# Patient Record
Sex: MALE | Race: WHITE | NOT HISPANIC OR LATINO | URBAN - METROPOLITAN AREA
[De-identification: names, ages, dates, MRNs, and addresses within clinical notes are randomized per-mention and may not be internally consistent; named-entity substitution may affect disease eponyms.]

---

## 2023-08-30 ENCOUNTER — HOSPITAL ENCOUNTER (OUTPATIENT)
Dept: DATA CONVERSION | Facility: HOSPITAL | Age: 58
Discharge: HOME | End: 2023-08-30
Payer: COMMERCIAL

## 2023-08-30 DIAGNOSIS — R53.83 OTHER FATIGUE: ICD-10-CM

## 2023-08-30 DIAGNOSIS — I10 ESSENTIAL (PRIMARY) HYPERTENSION: ICD-10-CM

## 2023-08-30 DIAGNOSIS — Z12.5 ENCOUNTER FOR SCREENING FOR MALIGNANT NEOPLASM OF PROSTATE: ICD-10-CM

## 2023-08-30 DIAGNOSIS — R73.9 HYPERGLYCEMIA, UNSPECIFIED: ICD-10-CM

## 2023-08-30 DIAGNOSIS — Z00.00 ENCOUNTER FOR GENERAL ADULT MEDICAL EXAMINATION WITHOUT ABNORMAL FINDINGS: ICD-10-CM

## 2023-08-30 LAB
ALBUMIN SERPL-MCNC: 4.4 GM/DL (ref 3.5–5)
ALBUMIN/GLOB SERPL: 1.6 RATIO (ref 1.5–3)
ALP BLD-CCNC: 60 U/L (ref 35–125)
ALT SERPL-CCNC: 15 U/L (ref 5–40)
ANION GAP SERPL CALCULATED.3IONS-SCNC: 9 MMOL/L (ref 0–19)
APPEARANCE PLAS: NORMAL
AST SERPL-CCNC: 20 U/L (ref 5–40)
BASOPHILS # BLD AUTO: 0.04 K/UL (ref 0–0.22)
BASOPHILS NFR BLD AUTO: 0.5 % (ref 0–1)
BILIRUB SERPL-MCNC: 1 MG/DL (ref 0.1–1.2)
BUN SERPL-MCNC: 19 MG/DL (ref 8–25)
BUN/CREAT SERPL: 21.1 RATIO (ref 8–21)
CALCIUM SERPL-MCNC: 9.6 MG/DL (ref 8.5–10.4)
CHLORIDE SERPL-SCNC: 104 MMOL/L (ref 97–107)
CHOLEST SERPL-MCNC: 173 MG/DL (ref 133–200)
CHOLEST/HDLC SERPL: 3.9 RATIO
CO2 SERPL-SCNC: 26 MMOL/L (ref 24–31)
COLOR SPUN FLD: NORMAL
CREAT SERPL-MCNC: 0.9 MG/DL (ref 0.4–1.6)
DEPRECATED RDW RBC AUTO: 42.5 FL (ref 37–54)
DIFFERENTIAL METHOD BLD: ABNORMAL
EOSINOPHIL # BLD AUTO: 0.37 K/UL (ref 0–0.45)
EOSINOPHIL NFR BLD: 5 % (ref 0–3)
ERYTHROCYTE [DISTWIDTH] IN BLOOD BY AUTOMATED COUNT: 12.5 % (ref 11.7–15)
FASTING STATUS PATIENT QL REPORTED: NORMAL
GFR SERPL CREATININE-BSD FRML MDRD: 99 ML/MIN/1.73 M2
GLOBULIN SER-MCNC: 2.7 G/DL (ref 1.9–3.7)
GLUCOSE SERPL-MCNC: 96 MG/DL (ref 65–99)
HBA1C MFR BLD: 5.6 % (ref 4–6)
HCT VFR BLD AUTO: 48.2 % (ref 41–50)
HDLC SERPL-MCNC: 44 MG/DL
HGB BLD-MCNC: 16 GM/DL (ref 13.5–16.5)
IMM GRANULOCYTES # BLD AUTO: 0 K/UL (ref 0–0.1)
LDLC SERPL CALC-MCNC: 111 MG/DL (ref 65–130)
LYMPHOCYTES # BLD AUTO: 2.37 K/UL (ref 1.2–3.2)
LYMPHOCYTES NFR BLD MANUAL: 32.2 % (ref 20–40)
MCH RBC QN AUTO: 30.4 PG (ref 26–34)
MCHC RBC AUTO-ENTMCNC: 33.2 % (ref 31–37)
MCV RBC AUTO: 91.5 FL (ref 80–100)
MONOCYTES # BLD AUTO: 0.7 K/UL (ref 0–0.8)
MONOCYTES NFR BLD MANUAL: 9.5 % (ref 0–8)
NEUTROPHILS # BLD AUTO: 3.87 K/UL
NEUTROPHILS # BLD AUTO: 3.87 K/UL (ref 1.8–7.7)
NEUTROPHILS.IMMATURE NFR BLD: 0 % (ref 0–1)
NEUTS SEG NFR BLD: 52.8 % (ref 50–70)
NRBC BLD-RTO: 0 /100 WBC
PLATELET # BLD AUTO: 212 K/UL (ref 150–450)
PMV BLD AUTO: 11.2 CU (ref 7–12.6)
POTASSIUM SERPL-SCNC: 5 MMOL/L (ref 3.4–5.1)
PROT SERPL-MCNC: 7.1 G/DL (ref 5.9–7.9)
PSA SERPL-MCNC: 0.8 NG/ML (ref 0–4.1)
RBC # BLD AUTO: 5.27 M/UL (ref 4.5–5.5)
SODIUM SERPL-SCNC: 139 MMOL/L (ref 133–145)
TRIGL SERPL-MCNC: 92 MG/DL (ref 40–150)
TSH SERPL DL<=0.05 MIU/L-ACNC: 1.61 MIU/L (ref 0.27–4.2)
URATE SERPL-MCNC: 6.2 MG/DL (ref 3.6–7.7)
VIT B12 SERPL-MCNC: 474 PG/ML (ref 211–946)
WBC # BLD AUTO: 7.4 K/UL (ref 4.5–11)

## 2023-09-16 VITALS
BODY MASS INDEX: 27.46 KG/M2 | HEART RATE: 58 BPM | TEMPERATURE: 98 F | OXYGEN SATURATION: 98 % | HEIGHT: 73 IN | DIASTOLIC BLOOD PRESSURE: 70 MMHG | WEIGHT: 207.2 LBS | RESPIRATION RATE: 14 BRPM | SYSTOLIC BLOOD PRESSURE: 122 MMHG

## 2023-12-20 DIAGNOSIS — N52.9 ERECTILE DISORDER: Primary | ICD-10-CM

## 2023-12-29 RX ORDER — SILDENAFIL CITRATE 20 MG/1
TABLET ORAL
Qty: 30 TABLET | Refills: 5 | Status: SHIPPED | OUTPATIENT
Start: 2023-12-29 | End: 2024-05-08 | Stop reason: SDUPTHER

## 2024-04-25 ENCOUNTER — APPOINTMENT (OUTPATIENT)
Dept: PRIMARY CARE | Facility: CLINIC | Age: 59
End: 2024-04-25
Payer: COMMERCIAL

## 2024-05-07 PROBLEM — M35.3 POLYMYALGIA RHEUMATICA (MULTI): Status: ACTIVE | Noted: 2018-01-02

## 2024-05-07 PROBLEM — I10 HYPERTENSION: Status: ACTIVE | Noted: 2024-05-07

## 2024-05-07 PROBLEM — N52.9 ERECTILE DYSFUNCTION: Status: ACTIVE | Noted: 2024-05-07

## 2024-05-07 RX ORDER — LOSARTAN POTASSIUM 25 MG/1
25 TABLET ORAL DAILY
COMMUNITY

## 2024-05-07 RX ORDER — HYDROXYCHLOROQUINE SULFATE 200 MG/1
200 TABLET, FILM COATED ORAL EVERY OTHER DAY
COMMUNITY
Start: 2023-08-30

## 2024-05-07 RX ORDER — ESOMEPRAZOLE MAGNESIUM 40 MG/1
1 CAPSULE, DELAYED RELEASE ORAL
COMMUNITY
Start: 2021-08-24

## 2024-05-07 NOTE — PROGRESS NOTES
Subjective     HPI   Gael Hamilton is a 58 y.o. year old male patient with presenting to clinic with concern for   Chief Complaint   Patient presents with    New Patient Visit       Gael presents to clinic to establish care as a new patient. He was previously seen by Dr. Ashish Evans.    Rheumatology: Dr. Janeth Berg [facility]  Polymyalgia Rheumatica    Perianal lump noted for the past 2 weeks.      CARDIAC TESTIN24 ECHO  CONCLUSIONS:  - Exam indication: Palpitations  - The left ventricle is normal in size. Left ventricular systolic function is  normal. EF = 57 ± 5% (2D biplane) Normal left ventricular diastolic function.  - The right ventricle is normal in size. Right ventricular systolic function is normal.  - There are no significant valvular abnormalities.  - The patient has not had a prior [facility] echocardiographic exam for comparison.    Electronically signed by Nikita Obando MD on 2024 at 3:15:37 PM    Zio monitor- 11/10/23-23:  Patient had a min HR of 48 bpm, max HR of 178 bpm, and avg HR of 76  bpm. Predominant underlying rhythm was Sinus Rhythm. First Degree AV  Block was present. Junctional Rhythm was present. Junctional Rhythm was  detected within +/- 45 seconds of symptomatic patient event(s). Isolated  SVEs were rare (<1.0%), SVE Couplets were rare (<1.0%), and SVE  Triplets were rare (<1.0%). Isolated VEs were rare (<1.0%), VE Couplets  were rare (<1.0%), and no VE Triplets were present. Ventricular Bigeminy  and Trigeminy were present. No AF, SVT, VT or pauses seen.    CLINICAL VISITS:  24:  /83  Pulse (!) 58  Wt 95.3 kg (210 lb)  BMI 27.71 kg/m²   Ongoing intermittent palpitations and episodes where heart is not beating correctly.   May be related to stress and high work load although does not always correlate with work.   Still feels drained afterwards.   Sx lasting 30 seconds, sometimes longer. If sits and closes eye can relax and go back into rhythm.   No  lightheadedness , no syncope or pre syncope   Playing tennis recreationally. Good activity tolerance.   Zio reviewed.   Rare PAC, Rare PAC 10 sec episode of bigeminy., 17 sec episode of trigeminy  Junctional rhythm HR 80 bpm ( 3:42 AM)   ECHO normal Min abnormalities.     Cardiology [facility] Nikita Obando MD      Patient Active Problem List   Diagnosis    Erectile dysfunction    Polymyalgia rheumatica (Multi)    Primary osteoarthritis of left knee    Hypertension       Past Medical History:   Diagnosis Date    Old tear of anterior cruciate ligament 12/01/2016      Past Surgical History:   Procedure Laterality Date    KNEE      ACL repair      Family History   Problem Relation Name Age of Onset    Breast cancer Mother          92 yr old    Heart attack Father      Hyperlipidemia Father      Diabetes type I Daughter          2 daughters both have celiac disease    Celiac disease Daughter      Stomach cancer Maternal Grandmother        Social History     Tobacco Use    Smoking status: Former     Current packs/day: 0.50     Types: Cigarettes    Smokeless tobacco: Never    Tobacco comments:     When he was younger    Substance Use Topics    Alcohol use: Yes     Comment: social        Current Outpatient Medications:     esomeprazole (NexIUM) 40 mg DR capsule, Take 1 capsule (40 mg) by mouth once daily in the morning. Take before meals., Disp: , Rfl:     hydroxychloroquine (Plaquenil) 200 mg tablet, Take 1 tablet (200 mg) by mouth every other day., Disp: , Rfl:     losartan (Cozaar) 25 mg tablet, Take 1 tablet (25 mg) by mouth once daily., Disp: , Rfl:     sildenafil (Revatio) 20 mg tablet, TAKE UP TO 5 TABLETS BY MOUTH 1 HOUR PRIOR TO INTERCOURSE ONCE DAILY WHEN NEEDED., Disp: 30 tablet, Rfl: 5     Review of Systems  Constitutional: Denies fever  HEENT: Denies ST, earache  CVS: Denies Chest pain  Pulmonary: Denies wheezing, SOB  GI: Denies N/V  : Denies dysuria  Musculoskeletal:  Denies myalgia  Neuro: Denies focal  "weakness or numbness.  Skin: Denies Rashes.  *Review of Systems is negative unless otherwise mentioned in HPI or ROS above.    Objective   /70   Pulse 66   Temp 36.4 °C (97.6 °F)   Ht 1.842 m (6' 0.5\")   Wt 94 kg (207 lb 3.2 oz)   SpO2 96%   BMI 27.72 kg/m²  reviewed Body mass index is 27.72 kg/m².     Physical Exam  Constitutional: NAD.  Resting comfortably.  Head: Atraumatic, normocephalic.  ENT: Moist oral mucosa. Nasal mucosa wnl.   Cardiac: Regular rate & rhythm.   Pulmonary: Lungs clear bilat  GI: Soft, Nontender, nondistended.   Musculoskeletal: No peripheral edema.   Skin: No evidence of trauma. No rashes  Psych: Intact judgement and insight.    .Assessment/Plan   Problem List Items Addressed This Visit    None    Problem List Items Addressed This Visit       Polymyalgia rheumatica (Multi)     Formatting of this note might be different from the original.   Dx 2017 , - RF , - [facility]     inflammatory markers  ++ elevated    - MARILYN, - CHANDNI, - DNA,    on Plaquenil since  3/2019- needs eye exams            Hypertension - Primary     Other Visit Diagnoses       Erectile disorder        Lesion of perianal area                  "

## 2024-05-08 ENCOUNTER — OFFICE VISIT (OUTPATIENT)
Dept: PRIMARY CARE | Facility: CLINIC | Age: 59
End: 2024-05-08
Payer: COMMERCIAL

## 2024-05-08 VITALS
HEIGHT: 73 IN | TEMPERATURE: 97.6 F | DIASTOLIC BLOOD PRESSURE: 70 MMHG | SYSTOLIC BLOOD PRESSURE: 114 MMHG | OXYGEN SATURATION: 96 % | BODY MASS INDEX: 27.46 KG/M2 | HEART RATE: 66 BPM | WEIGHT: 207.2 LBS

## 2024-05-08 DIAGNOSIS — M35.3 POLYMYALGIA RHEUMATICA (MULTI): ICD-10-CM

## 2024-05-08 DIAGNOSIS — N52.9 ERECTILE DISORDER: ICD-10-CM

## 2024-05-08 DIAGNOSIS — I10 PRIMARY HYPERTENSION: Primary | ICD-10-CM

## 2024-05-08 DIAGNOSIS — K62.9 LESION OF PERIANAL AREA: ICD-10-CM

## 2024-05-08 PROCEDURE — 3074F SYST BP LT 130 MM HG: CPT | Performed by: PHYSICIAN ASSISTANT

## 2024-05-08 PROCEDURE — 1036F TOBACCO NON-USER: CPT | Performed by: PHYSICIAN ASSISTANT

## 2024-05-08 PROCEDURE — 99214 OFFICE O/P EST MOD 30 MIN: CPT | Performed by: PHYSICIAN ASSISTANT

## 2024-05-08 PROCEDURE — 3078F DIAST BP <80 MM HG: CPT | Performed by: PHYSICIAN ASSISTANT

## 2024-05-08 RX ORDER — SILDENAFIL CITRATE 20 MG/1
TABLET ORAL
Qty: 30 TABLET | Refills: 5 | Status: SHIPPED | OUTPATIENT
Start: 2024-05-08

## 2024-05-08 RX ORDER — AMOXICILLIN AND CLAVULANATE POTASSIUM 875; 125 MG/1; MG/1
875 TABLET, FILM COATED ORAL 2 TIMES DAILY
Qty: 20 TABLET | Refills: 0 | Status: SHIPPED | OUTPATIENT
Start: 2024-05-08 | End: 2024-05-18

## 2024-05-08 ASSESSMENT — PATIENT HEALTH QUESTIONNAIRE - PHQ9
SUM OF ALL RESPONSES TO PHQ9 QUESTIONS 1 AND 2: 0
1. LITTLE INTEREST OR PLEASURE IN DOING THINGS: NOT AT ALL
2. FEELING DOWN, DEPRESSED OR HOPELESS: NOT AT ALL

## 2024-05-08 NOTE — ASSESSMENT & PLAN NOTE
Formatting of this note might be different from the original.   Dx 2017 , - RF , - [facility]     inflammatory markers  ++ elevated    - MARILYN, - CHANDNI, - DNA,    on Plaquenil since  3/2019- needs eye exams

## 2024-08-22 RX ORDER — LOSARTAN POTASSIUM 25 MG/1
25 TABLET ORAL DAILY
Qty: 90 TABLET | Refills: 2 | OUTPATIENT
Start: 2024-08-22

## 2024-09-11 DIAGNOSIS — I10 PRIMARY HYPERTENSION: ICD-10-CM

## 2024-09-11 RX ORDER — LOSARTAN POTASSIUM 25 MG/1
25 TABLET ORAL DAILY
Qty: 90 TABLET | Refills: 1 | Status: SHIPPED | OUTPATIENT
Start: 2024-09-11

## 2024-11-12 ENCOUNTER — APPOINTMENT (OUTPATIENT)
Dept: PRIMARY CARE | Facility: CLINIC | Age: 59
End: 2024-11-12
Payer: COMMERCIAL

## 2024-11-12 VITALS
HEIGHT: 73 IN | SYSTOLIC BLOOD PRESSURE: 130 MMHG | OXYGEN SATURATION: 99 % | DIASTOLIC BLOOD PRESSURE: 72 MMHG | HEART RATE: 86 BPM | BODY MASS INDEX: 27.22 KG/M2 | WEIGHT: 205.4 LBS | TEMPERATURE: 97.6 F

## 2024-11-12 DIAGNOSIS — I10 PRIMARY HYPERTENSION: Primary | ICD-10-CM

## 2024-11-12 DIAGNOSIS — R73.9 HYPERGLYCEMIA: ICD-10-CM

## 2024-11-12 DIAGNOSIS — N52.9 ERECTILE DISORDER: ICD-10-CM

## 2024-11-12 DIAGNOSIS — Z12.5 SCREENING FOR MALIGNANT NEOPLASM OF PROSTATE: ICD-10-CM

## 2024-11-12 DIAGNOSIS — K21.9 GASTROESOPHAGEAL REFLUX DISEASE WITHOUT ESOPHAGITIS: ICD-10-CM

## 2024-11-12 DIAGNOSIS — E78.5 BORDERLINE HYPERLIPIDEMIA: ICD-10-CM

## 2024-11-12 PROCEDURE — 99213 OFFICE O/P EST LOW 20 MIN: CPT | Performed by: PHYSICIAN ASSISTANT

## 2024-11-12 PROCEDURE — 1036F TOBACCO NON-USER: CPT | Performed by: PHYSICIAN ASSISTANT

## 2024-11-12 PROCEDURE — 3008F BODY MASS INDEX DOCD: CPT | Performed by: PHYSICIAN ASSISTANT

## 2024-11-12 PROCEDURE — 3078F DIAST BP <80 MM HG: CPT | Performed by: PHYSICIAN ASSISTANT

## 2024-11-12 PROCEDURE — 3075F SYST BP GE 130 - 139MM HG: CPT | Performed by: PHYSICIAN ASSISTANT

## 2024-11-12 RX ORDER — ESOMEPRAZOLE MAGNESIUM 40 MG/1
40 CAPSULE, DELAYED RELEASE ORAL
Qty: 90 CAPSULE | Refills: 3 | Status: SHIPPED | OUTPATIENT
Start: 2024-11-12 | End: 2025-11-12

## 2024-11-12 RX ORDER — LOSARTAN POTASSIUM 25 MG/1
25 TABLET ORAL DAILY
Qty: 90 TABLET | Refills: 3 | Status: SHIPPED | OUTPATIENT
Start: 2024-11-12 | End: 2025-11-12

## 2024-11-12 RX ORDER — SILDENAFIL CITRATE 20 MG/1
TABLET ORAL
Qty: 30 TABLET | Refills: 5 | Status: SHIPPED | OUTPATIENT
Start: 2024-11-12

## 2024-11-12 NOTE — PROGRESS NOTES
Subjective     HPI   Gael Hamilton is a 59 y.o. year old male patient with presenting to clinic with concern for   Chief Complaint   Patient presents with    Follow-up       6 mo follow up    GERD-   - esomeprazole 40mg    HTN- losatan 25mg  BP Readings from Last 5 Encounters:   11/12/24 130/72   05/08/24 114/70   11/05/23 (!) 148/92   08/30/23 122/70   08/30/23 122/70       Cardiology [facility] Nikita Obando MD  2/28/24 ECHO  - The left ventricle is normal in size. Left ventricular systolic function is  normal. EF = 57 ± 5% (2D biplane) Normal left ventricular diastolic     Zio monitor- 11/10/23-11/24/23:  May be related to stress and high work load although does not always correlate with work.   Rare PAC-10 sec episode of bigeminy., 17 sec episode of trigeminy  ECHO normal Min abnormalities.       Erectile dysfunction  100 sildenaril (20 Revatio x5) 1hr prior to intercourse    Patient Active Problem List   Diagnosis    Erectile dysfunction    Polymyalgia rheumatica (Multi)    Primary osteoarthritis of left knee    Hypertension       Past Medical History:   Diagnosis Date    Old tear of anterior cruciate ligament 12/01/2016      Past Surgical History:   Procedure Laterality Date    KNEE      ACL repair      Family History   Problem Relation Name Age of Onset    Breast cancer Mother          92 yr old    Heart attack Father      Hyperlipidemia Father      Diabetes type I Daughter          2 daughters both have celiac disease    Celiac disease Daughter      Stomach cancer Maternal Grandmother        Social History     Tobacco Use    Smoking status: Former     Current packs/day: 0.50     Types: Cigarettes    Smokeless tobacco: Never    Tobacco comments:     When he was younger    Substance Use Topics    Alcohol use: Yes     Comment: social        Current Outpatient Medications:     hydroxychloroquine (Plaquenil) 200 mg tablet, Take 1 tablet (200 mg) by mouth every other day., Disp: , Rfl:     esomeprazole (NexIUM) 40 mg   "capsule, Take 1 capsule (40 mg) by mouth once daily in the morning. Take before meals., Disp: 90 capsule, Rfl: 3    losartan (Cozaar) 25 mg tablet, Take 1 tablet (25 mg) by mouth once daily., Disp: 90 tablet, Rfl: 3    sildenafil (Revatio) 20 mg tablet, TAKE UP TO 5 TABLETS BY MOUTH 1 HOUR PRIOR TO INTERCOURSE ONCE DAILY WHEN NEEDED, Disp: 30 tablet, Rfl: 5     Review of Systems  Constitutional: Denies fever  HEENT: Denies ST, earache  CVS: Denies Chest pain  Pulmonary: Denies wheezing, SOB  GI: Denies N/V  : Denies dysuria  Musculoskeletal:  Denies myalgia  Neuro: Denies focal weakness or numbness.  Skin: Denies Rashes.  *Review of Systems is negative unless otherwise mentioned in HPI or ROS above.    Objective   /72   Pulse 86   Temp 36.4 °C (97.6 °F)   Ht 1.854 m (6' 1\")   Wt 93.2 kg (205 lb 6.4 oz)   SpO2 99%   BMI 27.10 kg/m²  reviewed Body mass index is 27.1 kg/m².     Physical Exam  Constitutional: NAD. Afebrile. Resting comfortably.  ENT: Nasal mucosa and oropharynx: moist oral mucosa. Posterior oropharynx nonerythematous. No posterior pharyngeal streaking.  TM: Bilat TM nonerythematous, pearly grey, landmarks intact. EAC wnl bilat.  Eyes: PERRLA. EOM intact.   Lymph: No anterior cervical chain or submandibular lymphadenopathy. No sentinel lymph nodes.  Cardiac: Regular rate & rhythm. No murmur, gallops, or rubs.  Pulmonary: Lungs clear to auscultation bilaterally with good aeration. No wheezes, rhonchi, or rales.   GI: Soft, Nontender, nondistended. No guarding. Normal BS x4.  : No suprapubic tenderness. No CVA tenderness to percussion.   Musculoskeletal: No peripheral edema.   Skin: No evidence of trauma. No rashes  Neuro: No focal neuro deficits. Normal gait without assistive devices.  Psych: Intact judgement and insight.      .Assessment/Plan   Problem List Items Addressed This Visit             ICD-10-CM    Hypertension - Primary I10    Relevant Medications    losartan (Cozaar) 25 mg " tablet     Other Visit Diagnoses         Codes    Gastroesophageal reflux disease without esophagitis     K21.9    Relevant Medications    esomeprazole (NexIUM) 40 mg DR capsule    Erectile disorder     N52.9    Relevant Medications    sildenafil (Revatio) 20 mg tablet    Borderline hyperlipidemia     E78.5    Relevant Orders    CBC    Comprehensive Metabolic Panel    Lipid Panel    Screening for malignant neoplasm of prostate     Z12.5    Relevant Orders    Prostate Specific Antigen, Screen    Hyperglycemia     R73.9    Relevant Orders    Hemoglobin A1c

## 2024-11-23 ENCOUNTER — LAB (OUTPATIENT)
Dept: LAB | Facility: LAB | Age: 59
End: 2024-11-23
Payer: COMMERCIAL

## 2024-11-23 DIAGNOSIS — E78.5 BORDERLINE HYPERLIPIDEMIA: ICD-10-CM

## 2024-11-23 DIAGNOSIS — Z12.5 SCREENING FOR MALIGNANT NEOPLASM OF PROSTATE: ICD-10-CM

## 2024-11-23 DIAGNOSIS — R73.9 HYPERGLYCEMIA: ICD-10-CM

## 2024-11-23 LAB
ALBUMIN SERPL BCP-MCNC: 4.4 G/DL (ref 3.4–5)
ALP SERPL-CCNC: 51 U/L (ref 33–120)
ALT SERPL W P-5'-P-CCNC: 15 U/L (ref 10–52)
ANION GAP SERPL CALC-SCNC: 10 MMOL/L (ref 10–20)
AST SERPL W P-5'-P-CCNC: 15 U/L (ref 9–39)
BILIRUB SERPL-MCNC: 0.9 MG/DL (ref 0–1.2)
BUN SERPL-MCNC: 21 MG/DL (ref 6–23)
CALCIUM SERPL-MCNC: 9.3 MG/DL (ref 8.6–10.3)
CHLORIDE SERPL-SCNC: 103 MMOL/L (ref 98–107)
CHOLEST SERPL-MCNC: 177 MG/DL (ref 0–199)
CHOLESTEROL/HDL RATIO: 3.7
CO2 SERPL-SCNC: 30 MMOL/L (ref 21–32)
CREAT SERPL-MCNC: 1.05 MG/DL (ref 0.5–1.3)
EGFRCR SERPLBLD CKD-EPI 2021: 82 ML/MIN/1.73M*2
ERYTHROCYTE [DISTWIDTH] IN BLOOD BY AUTOMATED COUNT: 12.3 % (ref 11.5–14.5)
EST. AVERAGE GLUCOSE BLD GHB EST-MCNC: 105 MG/DL
GLUCOSE SERPL-MCNC: 88 MG/DL (ref 74–99)
HBA1C MFR BLD: 5.3 %
HCT VFR BLD AUTO: 46.8 % (ref 41–52)
HDLC SERPL-MCNC: 47.9 MG/DL
HGB BLD-MCNC: 15.5 G/DL (ref 13.5–17.5)
LDLC SERPL CALC-MCNC: 114 MG/DL
MCH RBC QN AUTO: 30.5 PG (ref 26–34)
MCHC RBC AUTO-ENTMCNC: 33.1 G/DL (ref 32–36)
MCV RBC AUTO: 92 FL (ref 80–100)
NON HDL CHOLESTEROL: 129 MG/DL (ref 0–149)
NRBC BLD-RTO: 0 /100 WBCS (ref 0–0)
PLATELET # BLD AUTO: 222 X10*3/UL (ref 150–450)
POTASSIUM SERPL-SCNC: 4.9 MMOL/L (ref 3.5–5.3)
PROT SERPL-MCNC: 6.8 G/DL (ref 6.4–8.2)
PSA SERPL-MCNC: 0.98 NG/ML
RBC # BLD AUTO: 5.08 X10*6/UL (ref 4.5–5.9)
SODIUM SERPL-SCNC: 138 MMOL/L (ref 136–145)
TRIGL SERPL-MCNC: 77 MG/DL (ref 0–149)
VLDL: 15 MG/DL (ref 0–40)
WBC # BLD AUTO: 7 X10*3/UL (ref 4.4–11.3)

## 2024-11-23 PROCEDURE — 84153 ASSAY OF PSA TOTAL: CPT

## 2024-11-23 PROCEDURE — 83036 HEMOGLOBIN GLYCOSYLATED A1C: CPT

## 2024-11-23 PROCEDURE — 36415 COLL VENOUS BLD VENIPUNCTURE: CPT

## 2025-03-31 ENCOUNTER — TELEPHONE (OUTPATIENT)
Dept: PRIMARY CARE | Facility: CLINIC | Age: 60
End: 2025-03-31
Payer: COMMERCIAL

## 2025-03-31 DIAGNOSIS — A09 TRAVELER'S DIARRHEA: Primary | ICD-10-CM

## 2025-03-31 DIAGNOSIS — R11.0 NAUSEA: ICD-10-CM

## 2025-03-31 RX ORDER — ONDANSETRON 4 MG/1
4 TABLET, ORALLY DISINTEGRATING ORAL EVERY 8 HOURS PRN
Qty: 9 TABLET | Refills: 0 | Status: SHIPPED | OUTPATIENT
Start: 2025-03-31 | End: 2025-04-07

## 2025-03-31 RX ORDER — HYOSCYAMINE SULFATE 0.125 MG
0.12 TABLET ORAL EVERY 4 HOURS PRN
Qty: 12 TABLET | Refills: 0 | Status: SHIPPED | OUTPATIENT
Start: 2025-03-31 | End: 2025-04-02

## 2025-03-31 NOTE — TELEPHONE ENCOUNTER
Travelling to [place]. Requests meds for nausea and traveler's diarrhea    Concern for cipro OR azithro for traveller's diarrhea. Sent hycosamine for symptom management if needed. Instructions sent to wife Erika for avoiding sources of infectious diarrhea while travelling.

## 2025-04-02 ENCOUNTER — TELEPHONE (OUTPATIENT)
Dept: PRIMARY CARE | Facility: CLINIC | Age: 60
End: 2025-04-02
Payer: COMMERCIAL

## 2025-04-15 ENCOUNTER — APPOINTMENT (OUTPATIENT)
Dept: OTOLARYNGOLOGY | Facility: CLINIC | Age: 60
End: 2025-04-15
Payer: COMMERCIAL

## 2025-04-15 VITALS — TEMPERATURE: 96.8 F | HEIGHT: 73 IN | BODY MASS INDEX: 27.3 KG/M2 | WEIGHT: 206 LBS

## 2025-04-15 DIAGNOSIS — R13.10 DYSPHAGIA, UNSPECIFIED TYPE: Primary | ICD-10-CM

## 2025-04-15 DIAGNOSIS — T17.308A CHOKING, INITIAL ENCOUNTER: ICD-10-CM

## 2025-04-15 PROCEDURE — 99203 OFFICE O/P NEW LOW 30 MIN: CPT | Performed by: OTOLARYNGOLOGY

## 2025-04-15 PROCEDURE — 3008F BODY MASS INDEX DOCD: CPT | Performed by: OTOLARYNGOLOGY

## 2025-04-15 PROCEDURE — 1036F TOBACCO NON-USER: CPT | Performed by: OTOLARYNGOLOGY

## 2025-04-15 PROCEDURE — 31575 DIAGNOSTIC LARYNGOSCOPY: CPT | Performed by: OTOLARYNGOLOGY

## 2025-04-16 NOTE — PROGRESS NOTES
"HPI  Gael Hamilton is a 59 y.o. male with a long history of feeling like he is choking at times with different foods, drink, and even saliva.  He has no unintentional weight loss and food is not getting stuck.  He is breathing well.  No hemoptysis.  No throat pain.  His daughter recently finished training to become a speech language pathologist.  Given the duration of his symptoms and with her encouragement, he was advised to be evaluated      Past Medical History:   Diagnosis Date    Old tear of anterior cruciate ligament 12/01/2016            Medications:     Current Outpatient Medications:     esomeprazole (NexIUM) 40 mg DR capsule, Take 1 capsule (40 mg) by mouth once daily in the morning. Take before meals., Disp: 90 capsule, Rfl: 3    hydroxychloroquine (Plaquenil) 200 mg tablet, Take 1 tablet (200 mg) by mouth every other day., Disp: , Rfl:     losartan (Cozaar) 25 mg tablet, Take 1 tablet (25 mg) by mouth once daily., Disp: 90 tablet, Rfl: 3    sildenafil (Revatio) 20 mg tablet, TAKE UP TO 5 TABLETS BY MOUTH 1 HOUR PRIOR TO INTERCOURSE ONCE DAILY WHEN NEEDED, Disp: 30 tablet, Rfl: 5     Allergies:  No Known Allergies     Physical Exam:  Last Recorded Vitals  Temperature 36 °C (96.8 °F), height 1.854 m (6' 1\"), weight 93.4 kg (206 lb).  General:     General appearance: Well-developed, well-nourished in no acute distress.       Voice:  normal       Head/face: Normal appearance; nontender to palpation     Facial nerve function: Normal and symmetric bilaterally.    Oral/oropharynx:     Oral vestibule: Normal labial and gingival mucosa     Tongue/floor of mouth: Normal without lesion     Oropharynx: Clear.  No lesions present of the hard/soft palate, posterior pharynx    Neck:     Neck: Normal appearance, trachea midline     Salivary glands: Normal to palpation bilaterally     Lymph nodes: No cervical lymphadenopathy to palpation     Thyroid: No thyromegaly.  No palpable nodules     Range of motion: " Normal    Neurological:     Cortical functions: Alert and oriented x3, appropriate affect       Larynx/hypopharynx:     Laryngeal findings: Mirror exam inadequate or limited secondary to enlarged base of tongue and/or excessive gagging.  Flexible laryngoscopy performed secondary to inadequate mirror examination.  Verbal informed consent the flexible laryngoscope was passed through the nasal cavity.  Normal epiglottis, aryepiglottic folds, arytenoids, false vocal cords, true vocal cords.  Normal vocal cord mobility bilaterally was identified.  No mucosal masses or lesions.    Nasopharynx:     Nasopharynx: Normal    Ear:     Ear canal: Normal bilaterally     Tympanic membrane: Intact and mobile bilaterally     Pinna: Normal bilaterally     Hearing:  Gross hearing assessment normal by voice    Nose:     Visualized using: Anterior rhinoscopy     Nasopharynx: Inadequate mirror exam secondary to gag, anatomy.       Nasal dorsum: Nontraumatic midline appearance     Septum: Midline     Inferior turbinates: Normally sized     Mucosa: Bilateral, pink, normal appearing       ASSESSMENT/PLAN:  He does not have any lesions or pooling or obvious aspiration on flexible laryngoscopy.  Recommend modified barium swallow and follow-up after.  He will continue his antiacid in the meantime        Vega Garcia MD

## 2025-04-21 ENCOUNTER — HOSPITAL ENCOUNTER (OUTPATIENT)
Dept: RADIOLOGY | Facility: HOSPITAL | Age: 60
Discharge: HOME | End: 2025-04-21
Payer: COMMERCIAL

## 2025-04-21 DIAGNOSIS — R13.10 DYSPHAGIA, UNSPECIFIED TYPE: ICD-10-CM

## 2025-05-12 ENCOUNTER — HOSPITAL ENCOUNTER (OUTPATIENT)
Dept: RADIOLOGY | Facility: HOSPITAL | Age: 60
Discharge: HOME | End: 2025-05-12
Payer: COMMERCIAL

## 2025-05-12 DIAGNOSIS — R13.10 DYSPHAGIA, UNSPECIFIED TYPE: Primary | ICD-10-CM

## 2025-05-12 PROCEDURE — 74230 X-RAY XM SWLNG FUNCJ C+: CPT

## 2025-05-12 PROCEDURE — 92611 MOTION FLUOROSCOPY/SWALLOW: CPT | Mod: GN | Performed by: SPEECH-LANGUAGE PATHOLOGIST

## 2025-05-12 PROCEDURE — 2500000005 HC RX 250 GENERAL PHARMACY W/O HCPCS: Performed by: OTOLARYNGOLOGY

## 2025-05-12 PROCEDURE — 74230 X-RAY XM SWLNG FUNCJ C+: CPT | Performed by: RADIOLOGY

## 2025-05-12 RX ADMIN — BARIUM SULFATE 700 MG: 700 TABLET ORAL at 13:29

## 2025-05-12 RX ADMIN — BARIUM SULFATE 110 ML: 960 POWDER, FOR SUSPENSION ORAL at 13:29

## 2025-05-12 NOTE — PROCEDURES
Speech-Language Pathology    SLP Adult Outpatient MBSS Evaluation    Patient Name: Gael Hamilton  MRN: [id number]  Today's Date: 5/12/2025      Current Problem:   1. Dysphagia, oropharyngeal       MBSS completed. See combined radiologist and SLP report located under imaging for complete study results.       Modified Barium Swallow Study completed. Informed verbal consent obtained prior to completion of exam. Trials of thin, puree, soft solids, regular solids and 13 mm barium tablet were given.     SLP: LEONEL Trujillo   Contact info: [email or web address] chat; phone: [phone]    Reason for Referral: Pt reports inconsistent coughing when eating and also when not eating, choking on his own saliva.    Patient Hx: Per ENT note: Gael Hamilton is a 59 y.o. male with a long history of feeling like he is choking at times with different foods, drink, and even saliva.  He has no unintentional weight loss and food is not getting stuck.  He is breathing well.  No hemoptysis.  No throat pain.  His daughter recently finished training to become a speech language pathologist.  Given the duration of his symptoms and with her encouragement, he was advised to be evaluated.     *Pt reported GERD which he is medicated for.    Respiratory Status: Room air  Current diet: Regular consistency solids/Thin liquids    Pain:  No pain reported impacting therapy session.      DIET RECOMMENDATIONS:   Per the results of today's MBSS, patient to continue baseline diet of regular consistencies and thin liquids following swallow strategies listed below:    STRATEGIES:  - Upright for all PO intake  - Double swallow/Dry swallow every 1-2 bites/sips to completely clear bolus    SLP PLAN:  Skilled SLP Services: No further skilled SLP intervention is warranted for dysphagia at this time.    Education Provided: Results and recommendations. Pt reported understanding and agreement.     Additional Medical Consults Suggested:   - No new disciplines  indicated    Mechanics of the Swallow Summary:  ORAL PHASE:  Lip Closure - No labial escape/anterior loss of bolus   Tongue Control During Bolus Hold - Cohesive bolus between tongue to palatal seal   Bolus prep/mastication - Timely and efficient mastication skills   Bolus transport/lingual motion - Brisk tongue motion for A-P movement of the bolus   Oral residue - Complete oral clearance     PHARYNGEAL PHASE:  Initiation of pharyngeal swallow - Bolus head at vallecular pit - solids, posterior angle of ramus - liquids  Soft palate elevation - No bolus between soft palate/pharyngeal wall   Laryngeal elevation - Complete superior movement of thyroid cartilage with contact of arytenoids to epiglottic petiole   Anterior hyoid excursion - Complete anterior movement   Epiglottic movement - Complete inversion    Laryngeal vestibule closure - Complete - no air/contrast in laryngeal vestibule   Pharyngeal stripping wave - Complete  Pharyngoesophageal segment opening - Partial distension/partial duration with partial obstruction of flow of bolus   Tongue base retraction - Trace column of contrast or air between tongue base and pharyngeal wall   Pharyngeal residue - Trace residue within or on the pharyngeal structures - noted @ C5    ESOPHAGEAL PHASE:  Esophageal clearance - Complete clearance  - Noted increased liquid wash needed to transfer pill.    *Of note: The A-P bolus follow-through is not intended to be utilized as a diagnostic assessment of the esophagus, rather a tool to observe the biomechanical aspects of the swallow continuum and to inform the need for further evaluation by medical specialists, as applicable.     SLP Impressions with Severity Rating:   Pt presents with swallowing skills within normal limits upon completion of modified barium swallow study this date. Swallowing physiology is detailed above. The pt demonstrated no persistent residuals and no aspiration. There were 2 episodes of trace flash  penetration on thin liquids. Noted minimal tongue base, vallecula and pharyngeal residuals @ C5 which were effectively even further minimized with a dry swallow.     Strategies attempted- Dry swallow resulted in successful clearance of solids.    OUTCOME MEASURES:  Functional Oral Intake Scale  Functional Oral Intake Scale: Level 7 total oral diet with no restrictions     Eating Assessment Tool (EAT-10)   0=No problem, 1=Mild problem, 2=Mild to moderate problem, 3=Moderate problem, 4=Severe problem    EAT 10  My swallowing problem has caused me to lose weight.: 0  My swallowing problem interferes with my ability to go out for meals.: 0  Swallowing liquids takes extra effort.: 0  Swallowing solids takes extra effort.: 0  Swallowing pills takes extra effort.: 0  Swallowing is painful: 0  The pleasure of eating is affected by my swallowing.: 0  When I swallow food sticks in my throat.: 0  I cough when I eat.: 2  Swallowing is stressful: 0  EAT-10 TOTAL SCORE:: 2    A total score of 3 or above may indicate difficulty with swallowing safely and/or efficiently      Rosenbek's Penetration Aspiration Scale  Thin Liquids: 2. PENETRATION that CLEARS - contrast enter airway, above vocal cords, no residue*flash penetration x 2  Puree: 1. NO ASPIRATION & NO PENETRATION - no aspiration, contrast does not enter airway  Soft Solid: 1. NO ASPIRATION & NO PENETRATION - no aspiration, contrast does not enter airway  Solids: 1. NO ASPIRATION & NO PENETRATION - no aspiration, contrast does not enter airway

## 2025-06-13 ENCOUNTER — TELEPHONE (OUTPATIENT)
Dept: PRIMARY CARE | Facility: CLINIC | Age: 60
End: 2025-06-13
Payer: COMMERCIAL

## 2025-06-13 NOTE — TELEPHONE ENCOUNTER
Patient spouse called in, her  feels elevated bp mostly in the afternoon while at work.  He is interested in possibly increasing his bp meds.

## 2025-06-25 ENCOUNTER — PATIENT MESSAGE (OUTPATIENT)
Dept: PRIMARY CARE | Facility: CLINIC | Age: 60
End: 2025-06-25
Payer: COMMERCIAL

## 2025-06-25 DIAGNOSIS — I10 PRIMARY HYPERTENSION: ICD-10-CM

## 2025-06-26 RX ORDER — LOSARTAN POTASSIUM 50 MG/1
50 TABLET ORAL DAILY
Qty: 90 TABLET | Refills: 0 | Status: SHIPPED | OUTPATIENT
Start: 2025-06-26

## 2025-09-04 ENCOUNTER — APPOINTMENT (OUTPATIENT)
Dept: PRIMARY CARE | Facility: CLINIC | Age: 60
End: 2025-09-04
Payer: COMMERCIAL

## 2025-09-04 ASSESSMENT — PATIENT HEALTH QUESTIONNAIRE - PHQ9
1. LITTLE INTEREST OR PLEASURE IN DOING THINGS: NOT AT ALL
SUM OF ALL RESPONSES TO PHQ9 QUESTIONS 1 AND 2: 0
2. FEELING DOWN, DEPRESSED OR HOPELESS: NOT AT ALL

## 2025-09-18 ENCOUNTER — APPOINTMENT (OUTPATIENT)
Dept: PRIMARY CARE | Facility: CLINIC | Age: 60
End: 2025-09-18
Payer: COMMERCIAL

## 2025-11-12 ENCOUNTER — APPOINTMENT (OUTPATIENT)
Dept: PRIMARY CARE | Facility: CLINIC | Age: 60
End: 2025-11-12
Payer: COMMERCIAL